# Patient Record
(demographics unavailable — no encounter records)

---

## 2025-06-04 NOTE — PHYSICAL EXAM
[MA] : MA [Appropriately responsive] : appropriately responsive [Alert] : alert [No Acute Distress] : no acute distress [No Lymphadenopathy] : no lymphadenopathy [Regular Rate Rhythm] : regular rate rhythm [No Murmurs] : no murmurs [Clear to Auscultation B/L] : clear to auscultation bilaterally [Soft] : soft [Non-tender] : non-tender [Non-distended] : non-distended [No HSM] : No HSM [No Lesions] : no lesions [No Mass] : no mass [Oriented x3] : oriented x3 [Examination Of The Breasts] : a normal appearance [No Masses] : no breast masses were palpable [Vulvitis] : vulvitis [Labia Majora] : normal [Labia Minora] : normal [Normal] : normal [Uterine Adnexae] : normal [External Hemorrhoid] : external hemorrhoid [FreeTextEntry2] : Katiuska [FreeTextEntry1] : Follow-up with irritation noted and vestibule area

## 2025-06-04 NOTE — HISTORY OF PRESENT ILLNESS
[FreeTextEntry1] : Patient is 96 years old para 3-0-0-3 last menstrual period age 45 She has a history of breast cancer and is followed by Dr. Clinton at [facility]. Patient has a history of frequency of urination, nocturia, urinary stress incontinence and wears depends which causes vulvar irritation.

## 2025-06-04 NOTE — DISCUSSION/SUMMARY
[FreeTextEntry1] : Prescribed Lotrisone cream as directed Patient advised to use petroleum jelly/Aquaphor after showers daily as a skin protectant Advised follow-up with urologist Follow-up 3 months or as needed

## 2025-07-09 NOTE — HISTORY OF PRESENT ILLNESS
[FreeTextEntry1] : Patient is 46 y/o P 3003 LMP age 45 Patient has a history of urinary incontinence and wears Depends c/o perianal irritation. she notes improvement of vulva irritation She denies post menopausal bleeding

## 2025-07-09 NOTE — PHYSICAL EXAM
[MA] : MA [FreeTextEntry2] : Katiuska [Labia Majora] : normal [Labia Minora] : normal [Normal] : normal [Uterine Adnexae] : normal [External Hemorrhoid] : external hemorrhoid [FreeTextEntry9] : ? fissure

## 2025-07-11 NOTE — HISTORY OF PRESENT ILLNESS
[FreeTextEntry1] : Language: English Accompanied by: Self Contact info: [phone] Referring Provider/PCP: Lisa =============================================================================== FIRST VISIT:  The patient is a 93 year female  (x 3 vaginal deliveries) with a PMHx of HTN, HLD, osteoporosis, breast CA(on letrozole), CVA (2020-mild forgetfulness) who first presents 2022 for urinary frequency and "change in urine color". She first noticed that her urine was lighter two months ago. She has been experiencing frequency for about a year now. She also experiences urgency, with occasional urge incontinence. She wears 1 Depend diaper each day. Nocturia x 3-4, and has to change her pads 2-3 x a night. She experiences occasional "vaginal irritation" from wearing the diaper, and uses Desitin cream for it. She has been told she has a "dropped bladder". She denies constipation. She denies dysuria and hematuria. She was told at some point that her bladder had dropped.  She drinks 5-6 glasses of water a day. 1 cup of coffee a day. 1 glass of wine with dinner each evening. She rarely drinks soda.  She is not too bothered by the frequency, and not interested in medical therapy. She is more worried about the color of her urine. ------------------------------------------------------------------------------------------- Interval History 07/10/2025 (Initial Visit with Marielos): Presents today for f/u. Last seen in the office by Dr. Webster 22.   At her last visit, she was more concerned about the change in her urine color rather than her frequency or prolapse (was dx with mild apical and anterior prolapse).  She was reassured that this is normal and was rec'd to f/u PRN.   Today, she c/o worsening frequency and urgency. Voids about q1h during the day and night.  Saw another urologist 6 months ago, and she was told it was due to her prolapse, and she was prescribed mirabegron. Was concerned about GI AEs, therefore never took it.   Phyllis Doyle.  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  ALL: NKDA  ROS: Review of Systems is as per HPI unless otherwise denoted below =============================================================================== DATA: LABS:------------------------------------------------------------------------------------------------------------------- 2022: UA dip negative  PVR 22: 0 ml =============================================================================== PHYSICAL EXAM: General: NAD, sitting on exam table comfortably HEENT: NCAT, EOMI Resp: breathing comfortably on RA, b/l symm chest rise Cardiac: RRR Abd: SNTND Back: (-) CVAT b/l MSK: CABRERA w/ FROM Psych: appropriate affect ======================================================================================= ASSESSMENT and PLAN  96F with bothersome urinary frequency.   1. Urinary Frequency Discussed gemtesa.  She was already prescribed mirabegron, however never tried.  Discussed that there are a small percentage of patients who experience HTN as a side effect, therefore she preferred gemtesa. Discussed additional treatment options as well, including estrace and management of prolapse.  If gemtesa fails, will consider other options.  Has been BRETT for her breast cancer for approx 1y.    2. Prolapse As above, will discuss management if other treatment options ineffective. Will refer to Dr. Rodriguez   I have answered all of her questions today, and I will see her in 6 weeks or sooner PRN.  Plan: - start gemtesa - RTC 6 weeks or sooner PRN  I spent a total of 30 minutes on face-to-face counseling, coordination of care, review of prior records and clinical documentation.

## 2025-07-11 NOTE — HISTORY OF PRESENT ILLNESS
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 She first noticed that her urine was lighter two months ago. She has been experiencing frequency for about a year now. She also experiences urgency, with occasional urge incontinence. She wears 1 Depend diaper each day. Nocturia x 3-4, and has to change her pads 2-3 x a night. She experiences occasional "vaginal irritation" from wearing the diaper, and uses Desitin cream for it. She has been told she has a "dropped bladder". She denies constipation. She denies dysuria and hematuria. She was told at some point that her bladder had dropped.  She drinks 5-6 glasses of water a day. 1 cup of coffee a day. 1 glass of wine with dinner each evening. She rarely drinks soda.  She is not too bothered by the frequency, and not interested in medical therapy. She is more worried about the color of her urine. ------------------------------------------------------------------------------------------- Interval History 07/10/2025 (Initial Visit with Marielos): Presents today for f/u. Last seen in the office by Dr. Webster 22.   At her last visit, she was more concerned about the change in her urine color rather than her frequency or prolapse (was dx with mild apical and anterior prolapse).  She was reassured that this is normal and was rec'd to f/u PRN.   Today, she c/o worsening frequency and urgency. Voids about q1h during the day and night.  Saw another urologist 6 months ago, and she was told it was due to her prolapse, and she was prescribed mirabegron. Was concerned about GI AEs, therefore never took it.   Phyllis Doyle.  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  ALL: NKDA  ROS: Review of Systems is as per HPI unless otherwise denoted below =============================================================================== DATA: LABS:------------------------------------------------------------------------------------------------------------------- 2022: UA dip negative  PVR 22: 0 ml =============================================================================== PHYSICAL EXAM: General: NAD, sitting on exam table comfortably HEENT: NCAT, EOMI Resp: breathing comfortably on RA, b/l symm chest rise Cardiac: RRR Abd: SNTND Back: (-) CVAT b/l MSK: CABRERA w/ FROM Psych: appropriate affect ======================================================================================= ASSESSMENT and PLAN  96F with bothersome urinary frequency.   1. Urinary Frequency Discussed gemtesa.  She was already prescribed mirabegron, however never tried.  Discussed that there are a small percentage of patients who experience HTN as a side effect, therefore she preferred gemtesa. Discussed additional treatment options as well, including estrace and management of prolapse.  If gemtesa fails, will consider other options.  Has been BRETT for her breast cancer for approx 1y.    2. Prolapse As above, will discuss management if other treatment options ineffective. Will refer to Dr. Rodriguez   I have answered all of her questions today, and I will see her in 6 weeks or sooner PRN.  Plan: - start gemtesa - RTC 6 weeks or sooner PRN  I spent a total of 30 minutes on face-to-face counseling, coordination of care, review of prior records and clinical documentation.